# Patient Record
Sex: MALE | Race: WHITE | NOT HISPANIC OR LATINO | Employment: FULL TIME | URBAN - METROPOLITAN AREA
[De-identification: names, ages, dates, MRNs, and addresses within clinical notes are randomized per-mention and may not be internally consistent; named-entity substitution may affect disease eponyms.]

---

## 2017-10-19 PROBLEM — M75.101 ROTATOR CUFF SYNDROME OF RIGHT SHOULDER: Status: ACTIVE | Noted: 2017-10-19

## 2024-02-01 ENCOUNTER — RESEARCH ENCOUNTER (OUTPATIENT)
Dept: RESEARCH | Facility: MEDICAL CENTER | Age: 64
End: 2024-02-01

## 2024-02-01 DIAGNOSIS — Z00.6 RESEARCH STUDY PATIENT: ICD-10-CM

## 2024-02-01 NOTE — RESEARCH NOTE
Confirmed with the participant which designated provider they would like study results shared with. Patient will have an opportunity to share the results with any providers of their choosing in the future by accessing their results from [employer].

## 2024-02-08 ENCOUNTER — APPOINTMENT (OUTPATIENT)
Dept: LAB | Facility: MEDICAL CENTER | Age: 64
End: 2024-02-08
Attending: INTERNAL MEDICINE

## 2024-06-26 ENCOUNTER — NON-PROVIDER VISIT (OUTPATIENT)
Dept: WOUND CARE | Facility: MEDICAL CENTER | Age: 64
End: 2024-06-26
Attending: SURGERY
Payer: MEDICARE

## 2024-06-26 PROCEDURE — 99211 OFF/OP EST MAY X REQ PHY/QHP: CPT

## 2024-06-26 NOTE — PATIENT INSTRUCTIONS
-Change ileostomies every 3-5 days. Change colostomies every 5-7 days. Change appliance immediately if it is leaking or peristomal skin feels irritated, has itching, or  burning.   To change the appliance, remove previous appliance, cleanse peristomal skin with warm water/washcloth, pat dry, make an ostomy template or use cardboard measuring guide and trace ostomy shape onto back of barrier, cut out barrier, apply a paste ring around barrier opening and apply appliance. Empty pouches when no more than ½ full. Check contents every 2 hours or as needed. Do not leave soap residue on tissue and do not use baby wipes or skin prep wipes.

## 2024-06-26 NOTE — CERTIFICATION
"Ostomy Pre-Operative Marking and Teaching       Date of Surgery: 2024   Type of Surgery: Colectomy, total with ileostomy  Surgeon: Edward Arzola MD, PhD     Subjective: Patient states he has had 6 previous abdominal surgeries related to multiple hernia repair. Patient states he has been learning about ostomies at home and did a trial of wearing a pouch for 1 week to see if his skin was reactive to the adhesive with good results.     Objective: Assessed patient to identify potential stoma site within their line of site on a flat pouching surface, within the rectus muscle, away from belt-line, bony prominences, exiting scars and umbilicus.    Assessment:  Potential site (s) located for: Ileostomy   1. Procedure explained to the patient.  2. Rectus muscle identified with patient in supine position.  3. Appropriate abdominal quadrant for planned surgical procedure identified.  4. Existing scars identified.  5. Potential sites evaluated with patient:      a. Supine      b. Sitting       c. Bending forward/ twisting at waist   6. Site (s) selected with respect to skin folds, creases, abdominal contours and belt line.  7. Special considerations:  Previous hernia repairs  8. Potential site (s) marked with an \"X\" (skin prepped with skin protectant wipe, guillermo applied with indelible marker, guillermo fixed with skin protectant wipe again.     Site(s) marked: 1. Right Upper Quadrant; 2. Right Lower Quadrant; 3. Left Upper Quadrant        Patient Teachin. Reviewed nature of surgical procedure.  2. Reviewed basic anatomy and function of the GI Tract.  3. Reviewed and provided the patient with literature - Ileostomy   4. Pouching system with hands on demonstrated.  5. Questions and concerns addressed  6. Other: Recommend Hernia belt post-operatively    Post-Op Plan:    Family/Caregiver teaching/assistance needed.  Refer to Home Health for post op support and ostomy teaching  Refer to [facility] for " additional ostomy teaching and support    Report provided to: [facility] Inpatient Wound Team

## 2025-06-26 DIAGNOSIS — Z00.6 CLINICAL TRIAL PARTICIPANT: ICD-10-CM

## 2025-07-07 ENCOUNTER — APPOINTMENT (OUTPATIENT)
Dept: LAB | Facility: MEDICAL CENTER | Age: 65
End: 2025-07-07
Attending: FAMILY MEDICINE
Payer: MEDICARE